# Patient Record
Sex: FEMALE | Race: WHITE | NOT HISPANIC OR LATINO
[De-identification: names, ages, dates, MRNs, and addresses within clinical notes are randomized per-mention and may not be internally consistent; named-entity substitution may affect disease eponyms.]

---

## 2019-06-18 PROBLEM — Z00.00 ENCOUNTER FOR PREVENTIVE HEALTH EXAMINATION: Status: ACTIVE | Noted: 2019-06-18

## 2019-06-24 ENCOUNTER — APPOINTMENT (OUTPATIENT)
Dept: ORTHOPEDIC SURGERY | Facility: CLINIC | Age: 74
End: 2019-06-24
Payer: MEDICARE

## 2019-06-24 VITALS
BODY MASS INDEX: 21.82 KG/M2 | SYSTOLIC BLOOD PRESSURE: 159 MMHG | HEIGHT: 67 IN | HEART RATE: 90 BPM | DIASTOLIC BLOOD PRESSURE: 96 MMHG | WEIGHT: 139 LBS

## 2019-06-24 DIAGNOSIS — M48.07 SPINAL STENOSIS, LUMBOSACRAL REGION: ICD-10-CM

## 2019-06-24 DIAGNOSIS — M43.16 SPONDYLOLISTHESIS, LUMBAR REGION: ICD-10-CM

## 2019-06-24 PROCEDURE — 99204 OFFICE O/P NEW MOD 45 MIN: CPT

## 2019-06-24 NOTE — PHYSICAL EXAM
[Antalgic] : antalgic [SLR] : positive straight leg raise [de-identified] : Decreased patellar reflex on the right with some antalgia otherwise, 5 out of 5 motor strength, sensation is intact and symmetrical full range of motion flexion extension and rotation, no palpatory tenderness full range of motion of hips knees shoulders and elbows (all four extremities), no atrophy, negative straight leg raise, no pathological reflexes, no swelling, normal ambulation, no apparent distress skin is intact, no palpable lymph nodes, no upper or lower extremity instability, alert and oriented x3 and normal mood. Normal finger-to nose test.  [de-identified] : MRI lumbar reveals a large disc herniation at L2-3 on the right producing severe stenosis and right-sided L. compression. She also has stenosis at L3-4 and L4-5 with a spondylolisthesis-reviewed with the patient and .

## 2019-06-24 NOTE — DISCUSSION/SUMMARY
[Surgical risks reviewed] : Surgical risks reviewed [de-identified] : Lumbar spinal stenosis with spondylolisthesis.\par She also has a large herniation at L2-3 on the right.\par We discussed all options including surgery.\par Surgery of the lumbar decompression L2-L5 with the possibility of stabilization.\par She does suffer from a rheumatological disease and is on immunosuppressive medication which increases the risk of infection.\par Surgery will entail a lumbar decompressive procedure and fusion with possible instrumentation, local autograft bone, and demineralized bone matrix. Spinal cord monitoring will be used.\par Risks of surgery include infection, dural tear, nerve root injury, reherniation, future back pain, future leg pain, retained fragment, hematoma, urinary retention, worsening leg symptoms, footdrop, inability to place hardware, hardware breakage, nonunion, adjacent level breakdown requiring surgery, anesthetic risks, blood transfusion risks, positioning pain, visceral and vascular injury, deep vein thrombosis, pulmonary embolus, and death. Somatosensory evoked potentials and EMG monitoring will be used. Patient will need spinal orthosis pre and post operatively. All risks were explained not exclusive to the ones mentioned alternatives were discussed and all questions were answered the patient agrees and understands the above and is in complete agreement with the plan. \par Surgical website was provided.\par They would like to consider their options.\par They may go for second opinion.\par Thank you for the consultation.

## 2019-06-24 NOTE — HISTORY OF PRESENT ILLNESS
[Stable] : stable [de-identified] : Patient is complaining of right thigh and leg pain. \par It's been going on for weeks. She received multiple epidural injections with approximately 3 weeks of relief.\par The pain rates and L3-4 distribution to her right lower extremity.\par Overall she is having pain since October.\par She states that she was okay prior to this new pain.\par She also tried physical therapy.\par Sent in by Dr. Noé Gaines for consultation. \par She is here with her .\par No fever chills sweats nausea vomiting no bowel or bladder dysfunction, no recent weight loss or gain no night pain. This history is in addition to the intake form that I personally reviewed. \par No fever chills sweats nausea vomiting no bowel or bladder dysfunction, no recent weight loss or gain no night pain. This history is in addition to the intake form that I personally reviewed.

## 2019-06-24 NOTE — CONSULT LETTER
[Dear  ___] : Dear  [unfilled], [FreeTextEntry2] : Dr. Noé Gaines [Sincerely,] : Sincerely, [FreeTextEntry3] : Dr. Dean Osborn